# Patient Record
Sex: MALE | Race: BLACK OR AFRICAN AMERICAN | NOT HISPANIC OR LATINO | URBAN - METROPOLITAN AREA
[De-identification: names, ages, dates, MRNs, and addresses within clinical notes are randomized per-mention and may not be internally consistent; named-entity substitution may affect disease eponyms.]

---

## 2023-08-19 PROBLEM — N52.9 ERECTILE DYSFUNCTION: Status: ACTIVE | Noted: 2023-08-19

## 2023-08-19 PROBLEM — E78.9 ABNORMAL CHOLESTEROL TEST: Status: ACTIVE | Noted: 2023-08-19

## 2023-08-19 PROBLEM — E87.6 HYPOKALEMIA: Status: ACTIVE | Noted: 2023-08-19

## 2023-08-19 PROBLEM — M54.12 CERVICAL RADICULOPATHY: Status: ACTIVE | Noted: 2023-08-19

## 2023-08-19 PROBLEM — M54.16 LUMBAR RADICULOPATHY: Status: ACTIVE | Noted: 2023-08-19

## 2023-08-19 RX ORDER — CHOLECALCIFEROL (VITAMIN D3) 50 MCG
2000 TABLET ORAL EVERY OTHER DAY
COMMUNITY
Start: 2015-07-28

## 2023-08-19 RX ORDER — ROSUVASTATIN CALCIUM 10 MG/1
10 TABLET, COATED ORAL 2 TIMES WEEKLY
COMMUNITY
Start: 2019-08-22 | End: 2023-11-06

## 2023-08-19 RX ORDER — NABUMETONE 500 MG/1
1000 TABLET, FILM COATED ORAL 2 TIMES DAILY
COMMUNITY
Start: 2019-05-23

## 2023-08-20 NOTE — PROGRESS NOTES
Subjective   Reason for Visit: Harjit Kent Jr. is an 82 y.o. male here for a Medicare Wellness visit.               HPI  The patient reports a history of intermittent episodes of pain-unable to describe-over the right lower leg over the past 3 to 4 months.  He reports that the episodes have been precipitated by a great deal of walking.  He also reports a history of intermittent episodes of numbness in the toes of both feet over the past 3 to 4 months.  He reports that the episodes have been precipitated by sitting.  He reports no episodes of lower back pain this calendar year.  He reports no lower extremity weakness.  No other associated symptoms.  The patient reports that he has been trying to do as much in the way of physical therapy exercises as he can over the past several months which she feels has helped his condition probably more than the epidural that he received in October.    The patient reports no difficulty maintaining erections during intercourse over the past several months to a year.  He has not even used sildenafil.    He reports that he did sustain a burn on the ventral surface of the distal and of the right forearm recently.  He reports no fever, chills, drainage from the site.  No other new complaints.  Patient Care Team:  Josiah Beyer MD as PCP - General  Jos[facility] Beyer MD as PCP [facility]     Review of Systems  All systems have been reviewed and are normal except as previously noted  Objective   Vitals:  There were no vitals taken for this visit.      Physical Exam  Head - palpation revealed no tenderness over the maxillary or frontal sinuses.  Eyes - extraocular movements intact pupils equal and reactive to light fundi; revealed good retinal color no hemorrhages or exudates.  Ears - palpation of pinnas and traguses revealed no tenderness. External auditory canals not erythematous or swollen. TMs clear.   Nose - turbinates not erythematous or swollen; no septal deviation  noted.  Mouth - posterior pharynx is not erythematous or swollen. Tonsillar pillars appeared normal no exudates.  Neck - no lymphadenopathy. Thyroid gland not enlarged. No bruits.  Lungs - clear to auscultation bilaterally.  Cardiac - rate normal,, rhythm regular no murmurs no JVD.  Abdomen - soft nondistended normal active bowel sounds. Palpation revealed no tenderness or masses.  Rectal-scant stool guaiac negative. Hemorrhoids noted. Prostate gland only mildly enlarged, no masses  Pulses - 2+ bilaterally except left dorsalis pedis pulse-0  Extremities - no peripheral edema.  Skin - Blackish discoloration of all of the fingernails left hand. No erythema or eruptions noted.  Blackish discoloration of right and left first toenails and the left 2nd toenail also noted..  Skin-there is a 3 x 3 cm burn located on the ventral surface distal end of the right forearm.  No necrotic tissue noted.  No drainage noted.  No surrounding erythema noted.  Palpation revealed no tenderness or increase in warmth  Musculoskeletal  Lumbar spine-no erythema or swelling. Full range of motion with mild discomfort noted over the inferior surface of the right buttock on flexion, extension, and bending to the right. Full range of motion with no pain noted in all other directions of motion. Palpation revealed no tenderness or increase in warmth    Neurologic  Mental status-alert and oriented x3   Cranial nerves-2 through 12 grossly intact, no visual field abnormalities  Motor-no pronator drift noted, strength-5/5 in all muscle groups tested, , no tremor noted.  No bradykinesia noted.  No rigidity noted.  Negative pull test  Sensory-Light touch sensation fully intact  Pinprick sensation fully intact  Vibratory sensation not present right first toe, moderately diminished left first toe  Cerebellar-no truncal ataxia, good coordination finger-nose testing,, good coordination heel-to-shin testing, normal rapid alternating movements  Positive  Romberg; moderate abnormality in tandem gait.   Reflexes-ankle jerks 1+/4 bilaterally all other reflexes tested 2+/4 bilaterally  Assessment/Plan   Problem List Items Addressed This Visit    None       Assessment.  Elevated CT cardiac scoring test  Chronic urgency, chronic nocturia x2, chronic mild post void dribbling- probably secondary to BPH  Colonic polyps  Diverticulosis  Vitamin D deficiency-mild  Hypercholesterolemia  Bilateral cataracts  Presence of a burn located ventral surface distal end of the right forearm and appears to have healed  Onychomycosis of the first toenails and the third to the fifth toenails left foot first through third fingernails of the left hand  History of a brief episode of presyncope occurring July 2022--may have been secondary to orthostatic hypotension-etiology unclear.  Central canal stenosis and bilateral neuroforaminal stenosis cervical spine status post anterior cervical discectomy and fusion February 2010 secondary to herniated cervical discs.  Intermittent episodes of pain-unable to describe-right lower leg, intermittent episodes of numbness in the toes of both feet-probably secondary to central canal stenosis, bilateral neuroforaminal stenosis lumbar spine  Central canal stenosis at L4-L5, L3-L4, L2-L3.  Bilateral neuroforaminal stenosis L4-L5  Moderate narrowing of both subarticular recesses L5-S1        Plan.  Obtain EKG, urinalysis, CBC differential, CMP, fasting lipid profile, TSH, vitamin D level, vitamin B12 level, PSA today.  The patient is declining the pneumonia vaccine, and Shingrix and the flu vaccine for that matter...  Continue current medication regimen for now pending the results of lab work.  Patient should return for annual Medicare wellness visit in 1 year

## 2023-08-22 ENCOUNTER — OFFICE VISIT (OUTPATIENT)
Dept: PRIMARY CARE | Facility: CLINIC | Age: 83
End: 2023-08-22
Payer: MEDICARE

## 2023-08-22 ENCOUNTER — LAB (OUTPATIENT)
Dept: LAB | Facility: LAB | Age: 83
End: 2023-08-22
Payer: MEDICARE

## 2023-08-22 VITALS
WEIGHT: 155.25 LBS | DIASTOLIC BLOOD PRESSURE: 66 MMHG | HEART RATE: 82 BPM | SYSTOLIC BLOOD PRESSURE: 104 MMHG | BODY MASS INDEX: 22.93 KG/M2

## 2023-08-22 DIAGNOSIS — E78.9 ABNORMAL CHOLESTEROL TEST: ICD-10-CM

## 2023-08-22 DIAGNOSIS — F41.9 ANXIETY DISORDER, UNSPECIFIED TYPE: ICD-10-CM

## 2023-08-22 DIAGNOSIS — N40.1 BENIGN PROSTATIC HYPERPLASIA WITH URINARY FREQUENCY: ICD-10-CM

## 2023-08-22 DIAGNOSIS — R35.0 BENIGN PROSTATIC HYPERPLASIA WITH URINARY FREQUENCY: ICD-10-CM

## 2023-08-22 DIAGNOSIS — E78.9 ABNORMAL CHOLESTEROL TEST: Primary | ICD-10-CM

## 2023-08-22 DIAGNOSIS — E55.9 VITAMIN D DEFICIENCY: ICD-10-CM

## 2023-08-22 DIAGNOSIS — M54.16 LUMBAR RADICULOPATHY: ICD-10-CM

## 2023-08-22 DIAGNOSIS — E87.6 HYPOKALEMIA: ICD-10-CM

## 2023-08-22 DIAGNOSIS — N52.8 OTHER MALE ERECTILE DYSFUNCTION: ICD-10-CM

## 2023-08-22 LAB
ALANINE AMINOTRANSFERASE (SGPT) (U/L) IN SER/PLAS: 30 U/L (ref 10–52)
ALBUMIN (G/DL) IN SER/PLAS: 4.6 G/DL (ref 3.4–5)
ALKALINE PHOSPHATASE (U/L) IN SER/PLAS: 77 U/L (ref 33–136)
ANION GAP IN SER/PLAS: 14 MMOL/L (ref 10–20)
ASPARTATE AMINOTRANSFERASE (SGOT) (U/L) IN SER/PLAS: 29 U/L (ref 9–39)
BASOPHILS (10*3/UL) IN BLOOD BY AUTOMATED COUNT: 0.03 X10E9/L (ref 0–0.1)
BASOPHILS/100 LEUKOCYTES IN BLOOD BY AUTOMATED COUNT: 0.7 % (ref 0–2)
BILIRUBIN TOTAL (MG/DL) IN SER/PLAS: 1 MG/DL (ref 0–1.2)
C REACTIVE PROTEIN (MG/L) IN SER/PLAS BY HIGH SENSIT: 1.3 MG/L
CALCIDIOL (25 OH VITAMIN D3) (NG/ML) IN SER/PLAS: 45 NG/ML
CALCIUM (MG/DL) IN SER/PLAS: 10 MG/DL (ref 8.6–10.6)
CARBON DIOXIDE, TOTAL (MMOL/L) IN SER/PLAS: 29 MMOL/L (ref 21–32)
CHLORIDE (MMOL/L) IN SER/PLAS: 104 MMOL/L (ref 98–107)
CHOLESTEROL (MG/DL) IN SER/PLAS: 161 MG/DL (ref 0–199)
CHOLESTEROL IN HDL (MG/DL) IN SER/PLAS: 71.7 MG/DL
CHOLESTEROL/HDL RATIO: 2.2
COBALAMIN (VITAMIN B12) (PG/ML) IN SER/PLAS: 227 PG/ML (ref 211–911)
CREATININE (MG/DL) IN SER/PLAS: 1.12 MG/DL (ref 0.5–1.3)
EOSINOPHILS (10*3/UL) IN BLOOD BY AUTOMATED COUNT: 0.05 X10E9/L (ref 0–0.4)
EOSINOPHILS/100 LEUKOCYTES IN BLOOD BY AUTOMATED COUNT: 1.1 % (ref 0–6)
ERYTHROCYTE DISTRIBUTION WIDTH (RATIO) BY AUTOMATED COUNT: 13.8 % (ref 11.5–14.5)
ERYTHROCYTE MEAN CORPUSCULAR HEMOGLOBIN CONCENTRATION (G/DL) BY AUTOMATED: 32.9 G/DL (ref 32–36)
ERYTHROCYTE MEAN CORPUSCULAR VOLUME (FL) BY AUTOMATED COUNT: 96 FL (ref 80–100)
ERYTHROCYTES (10*6/UL) IN BLOOD BY AUTOMATED COUNT: 4.92 X10E12/L (ref 4.5–5.9)
GFR MALE: 66 ML/MIN/1.73M2
GLUCOSE (MG/DL) IN SER/PLAS: 88 MG/DL (ref 74–99)
HEMATOCRIT (%) IN BLOOD BY AUTOMATED COUNT: 47.4 % (ref 41–52)
HEMOGLOBIN (G/DL) IN BLOOD: 15.6 G/DL (ref 13.5–17.5)
IMMATURE GRANULOCYTES/100 LEUKOCYTES IN BLOOD BY AUTOMATED COUNT: 0 % (ref 0–0.9)
LDL: 73 MG/DL (ref 0–99)
LEUKOCYTES (10*3/UL) IN BLOOD BY AUTOMATED COUNT: 4.5 X10E9/L (ref 4.4–11.3)
LYMPHOCYTES (10*3/UL) IN BLOOD BY AUTOMATED COUNT: 1.2 X10E9/L (ref 0.8–3)
LYMPHOCYTES/100 LEUKOCYTES IN BLOOD BY AUTOMATED COUNT: 26.4 % (ref 13–44)
MONOCYTES (10*3/UL) IN BLOOD BY AUTOMATED COUNT: 0.41 X10E9/L (ref 0.05–0.8)
MONOCYTES/100 LEUKOCYTES IN BLOOD BY AUTOMATED COUNT: 9 % (ref 2–10)
NEUTROPHILS (10*3/UL) IN BLOOD BY AUTOMATED COUNT: 2.85 X10E9/L (ref 1.6–5.5)
NEUTROPHILS/100 LEUKOCYTES IN BLOOD BY AUTOMATED COUNT: 62.8 % (ref 40–80)
NRBC (PER 100 WBCS) BY AUTOMATED COUNT: 0 /100 WBC (ref 0–0)
PLATELETS (10*3/UL) IN BLOOD AUTOMATED COUNT: 202 X10E9/L (ref 150–450)
POC APPEARANCE, URINE: CLEAR
POC BILIRUBIN, URINE: NEGATIVE
POC BLOOD, URINE: ABNORMAL
POC COLOR, URINE: YELLOW
POC GLUCOSE, URINE: NEGATIVE MG/DL
POC KETONES, URINE: NEGATIVE MG/DL
POC LEUKOCYTES, URINE: NEGATIVE
POC NITRITE,URINE: NEGATIVE
POC PH, URINE: 5 PH
POC PROTEIN, URINE: NEGATIVE MG/DL
POC SPECIFIC GRAVITY, URINE: 1.01
POC UROBILINOGEN, URINE: 0.2 EU/DL
POTASSIUM (MMOL/L) IN SER/PLAS: 4 MMOL/L (ref 3.5–5.3)
PROSTATE SPECIFIC AG (NG/ML) IN SER/PLAS: 0.65 NG/ML (ref 0–4)
PROTEIN TOTAL: 7.1 G/DL (ref 6.4–8.2)
SODIUM (MMOL/L) IN SER/PLAS: 143 MMOL/L (ref 136–145)
THYROTROPIN (MIU/L) IN SER/PLAS BY DETECTION LIMIT <= 0.05 MIU/L: 1.39 MIU/L (ref 0.44–3.98)
TRIGLYCERIDE (MG/DL) IN SER/PLAS: 84 MG/DL (ref 0–149)
UREA NITROGEN (MG/DL) IN SER/PLAS: 15 MG/DL (ref 6–23)
VLDL: 17 MG/DL (ref 0–40)

## 2023-08-22 PROCEDURE — 1170F FXNL STATUS ASSESSED: CPT | Performed by: INTERNAL MEDICINE

## 2023-08-22 PROCEDURE — 84153 ASSAY OF PSA TOTAL: CPT

## 2023-08-22 PROCEDURE — 99215 OFFICE O/P EST HI 40 MIN: CPT | Performed by: INTERNAL MEDICINE

## 2023-08-22 PROCEDURE — 84443 ASSAY THYROID STIM HORMONE: CPT

## 2023-08-22 PROCEDURE — 1160F RVW MEDS BY RX/DR IN RCRD: CPT | Performed by: INTERNAL MEDICINE

## 2023-08-22 PROCEDURE — 85025 COMPLETE CBC W/AUTO DIFF WBC: CPT

## 2023-08-22 PROCEDURE — 80061 LIPID PANEL: CPT

## 2023-08-22 PROCEDURE — 82607 VITAMIN B-12: CPT

## 2023-08-22 PROCEDURE — 86141 C-REACTIVE PROTEIN HS: CPT

## 2023-08-22 PROCEDURE — 80053 COMPREHEN METABOLIC PANEL: CPT

## 2023-08-22 PROCEDURE — 81002 URINALYSIS NONAUTO W/O SCOPE: CPT | Performed by: INTERNAL MEDICINE

## 2023-08-22 PROCEDURE — 82306 VITAMIN D 25 HYDROXY: CPT

## 2023-08-22 PROCEDURE — 36415 COLL VENOUS BLD VENIPUNCTURE: CPT

## 2023-08-22 PROCEDURE — 1159F MED LIST DOCD IN RCRD: CPT | Performed by: INTERNAL MEDICINE

## 2023-08-22 RX ORDER — SILDENAFIL CITRATE 20 MG/1
60 TABLET ORAL
Qty: 60 TABLET | Refills: 1 | Status: SHIPPED | OUTPATIENT
Start: 2023-08-22

## 2023-08-22 RX ORDER — SILDENAFIL CITRATE 20 MG/1
20 TABLET ORAL
COMMUNITY
Start: 2016-08-01 | End: 2023-08-22 | Stop reason: SDUPTHER

## 2023-08-22 RX ORDER — METHYLPREDNISOLONE 4 MG/1
TABLET ORAL
Qty: 21 TABLET | Refills: 0 | Status: SHIPPED | OUTPATIENT
Start: 2023-08-22 | End: 2023-08-29

## 2023-08-22 ASSESSMENT — ACTIVITIES OF DAILY LIVING (ADL)
GROCERY_SHOPPING: INDEPENDENT
DOING_HOUSEWORK: INDEPENDENT
DRESSING: INDEPENDENT
TAKING_MEDICATION: INDEPENDENT
BATHING: INDEPENDENT
MANAGING_FINANCES: INDEPENDENT

## 2023-08-22 ASSESSMENT — ENCOUNTER SYMPTOMS
LOSS OF SENSATION IN FEET: 1
DEPRESSION: 0
OCCASIONAL FEELINGS OF UNSTEADINESS: 0

## 2023-11-04 DIAGNOSIS — E87.6 HYPOKALEMIA: ICD-10-CM

## 2023-11-04 DIAGNOSIS — E78.9 ABNORMAL CHOLESTEROL TEST: ICD-10-CM

## 2023-11-06 RX ORDER — ROSUVASTATIN CALCIUM 10 MG/1
TABLET, COATED ORAL
Qty: 26 TABLET | Refills: 3 | Status: SHIPPED | OUTPATIENT
Start: 2023-11-06

## 2024-08-25 NOTE — PROGRESS NOTES
Subjective   Reason for Visit: Harjit Kent Jr. is an 83 y.o. male here for a Medicare Wellness visit.               HPI  Over the past several months or longer, the patient reports no episodes of pain in the right lower leg and no episodes of numbness in the toes of both feet.  He reports no other new complaints.  Patient Care Team:  Josiah Beyer MD as PCP - General (Internal Medicine)  [facility]     Review of Systems  All systems have been reviewed and are normal except as previously noted  Objective   Vitals:  There were no vitals taken for this visit.      Physical Exam  Head - palpation revealed no tenderness over the maxillary or frontal sinuses.  Eyes - extraocular movements intact pupils equal and reactive to light fundi; revealed good retinal color no hemorrhages or exudates.  Ears - palpation of pinnas and traguses revealed no tenderness. External auditory canals not erythematous or swollen. TMs clear.   Nose - turbinates not erythematous or swollen; no septal deviation noted.  Mouth - posterior pharynx is not erythematous or swollen. Tonsillar pillars appeared normal no exudates.  Neck - no lymphadenopathy. Thyroid gland not enlarged. No bruits.  Lungs - clear to auscultation bilaterally.  Cardiac - rate normal,, rhythm regular no murmurs no JVD.  Abdomen - soft nondistended normal active bowel sounds. Palpation revealed no tenderness or masses.    Pulses - 2+ bilaterally except dorsalis pedis pulse left foot-0  Extremities - no peripheral edema.  Skin - Blackish discoloration of all of the fingernails left hand. No erythema or eruptions noted.  Blackish discoloration of right and left first toenails and the left 2nd toenail also noted..         Neurologic  Mental status-alert and oriented x3   Cranial nerves-2 through 12 grossly intact, no visual field abnormalities  Motor-no pronator drift noted, strength-5/5 in all muscle groups tested, , no tremor noted.   No bradykinesia noted.  No rigidity noted.  Negative pull test  Sensory-Light touch sensation fully intact  Pinprick sensation fully intact  Vibratory sensation diminished in the lower extremities bilaterally equally  Cerebellar-no truncal ataxia, good coordination finger-nose testing,, good coordination heel-to-shin testing, normal rapid alternating movements  Positive Romberg; poor coordination in tandem gait.   Reflexes-ankle jerks 1+/4 bilaterally all other reflexes tested 2+/4 bilaterally  Assessment/Plan   Elevated CT calcium score  Colonic polyps  Diverticulosis  Hypercholesterolemia  Lumbar radiculopathy        Assessment.  Elevated CT calcium score test  Chronic urgency, chronic nocturia x2, chronic mild post void dribbling- probably secondary to BPH  Colonic polyps  Diverticulosis  Vitamin D deficiency-mild  Hypercholesterolemia  Bilateral cataracts  Onychomycosis of the first toenails and the third to the fifth toenails left foot first through third fingernails of the left hand  History of a brief episode of presyncope occurring July 2022--may have been secondary to orthostatic hypotension-etiology unclear.  Central canal stenosis and bilateral neuroforaminal stenosis cervical spine status post anterior cervical discectomy and fusion February 2010 secondary to herniated cervical discs.  Central canal stenosis at L4-L5, L3-L4, L2-L3.  Bilateral neuroforaminal stenosis L4-L5  Moderate narrowing of both subarticular recesses L5-S1        Plan.  Obtain EKG, urinalysis, CBC differential, CMP, fasting lipid profile, TSH, vitamin D level, vitamin B12 level, PSA today..  Obtain serum immunoelectrophoresis as well today  The patient is declining the pneumonia vaccine, and Shingrix and the flu vaccine for that matter...  Continue current medication regimen for now pending the results of lab work.  Patient should return for annual Medicare wellness visit in 1 year

## 2024-08-27 ENCOUNTER — APPOINTMENT (OUTPATIENT)
Dept: PRIMARY CARE | Facility: CLINIC | Age: 84
End: 2024-08-27
Payer: MEDICARE

## 2024-08-27 ENCOUNTER — LAB (OUTPATIENT)
Dept: LAB | Facility: LAB | Age: 84
End: 2024-08-27
Payer: MEDICARE

## 2024-08-27 VITALS
WEIGHT: 154.4 LBS | HEART RATE: 92 BPM | BODY MASS INDEX: 22.8 KG/M2 | SYSTOLIC BLOOD PRESSURE: 100 MMHG | DIASTOLIC BLOOD PRESSURE: 60 MMHG

## 2024-08-27 DIAGNOSIS — E78.9 ABNORMAL CHOLESTEROL TEST: ICD-10-CM

## 2024-08-27 DIAGNOSIS — R25.2 CRAMP AND SPASM: ICD-10-CM

## 2024-08-27 DIAGNOSIS — M54.16 LUMBAR RADICULOPATHY: ICD-10-CM

## 2024-08-27 DIAGNOSIS — E55.9 VITAMIN D DEFICIENCY: ICD-10-CM

## 2024-08-27 DIAGNOSIS — N40.1 BENIGN PROSTATIC HYPERPLASIA WITH URINARY FREQUENCY: ICD-10-CM

## 2024-08-27 DIAGNOSIS — E78.9 ABNORMAL CHOLESTEROL TEST: Primary | ICD-10-CM

## 2024-08-27 DIAGNOSIS — R35.0 BENIGN PROSTATIC HYPERPLASIA WITH URINARY FREQUENCY: ICD-10-CM

## 2024-08-27 DIAGNOSIS — N52.8 OTHER MALE ERECTILE DYSFUNCTION: ICD-10-CM

## 2024-08-27 LAB
25(OH)D3 SERPL-MCNC: 56 NG/ML (ref 30–100)
ALBUMIN SERPL BCP-MCNC: 4.2 G/DL (ref 3.4–5)
ALP SERPL-CCNC: 67 U/L (ref 33–136)
ALT SERPL W P-5'-P-CCNC: 21 U/L (ref 10–52)
ANION GAP SERPL CALC-SCNC: 15 MMOL/L (ref 10–20)
AST SERPL W P-5'-P-CCNC: 26 U/L (ref 9–39)
BASOPHILS # BLD AUTO: 0.03 X10*3/UL (ref 0–0.1)
BASOPHILS NFR BLD AUTO: 0.9 %
BILIRUB SERPL-MCNC: 1.1 MG/DL (ref 0–1.2)
BUN SERPL-MCNC: 17 MG/DL (ref 6–23)
CALCIUM SERPL-MCNC: 9.9 MG/DL (ref 8.6–10.6)
CHLORIDE SERPL-SCNC: 105 MMOL/L (ref 98–107)
CHOLEST SERPL-MCNC: 168 MG/DL (ref 0–199)
CHOLESTEROL/HDL RATIO: 2.8
CO2 SERPL-SCNC: 28 MMOL/L (ref 21–32)
CREAT SERPL-MCNC: 1.17 MG/DL (ref 0.5–1.3)
CRP SERPL HS-MCNC: 0.8 MG/L
EGFRCR SERPLBLD CKD-EPI 2021: 63 ML/MIN/1.73M*2
EOSINOPHIL # BLD AUTO: 0.06 X10*3/UL (ref 0–0.4)
EOSINOPHIL NFR BLD AUTO: 1.7 %
ERYTHROCYTE [DISTWIDTH] IN BLOOD BY AUTOMATED COUNT: 14.2 % (ref 11.5–14.5)
GLUCOSE SERPL-MCNC: 83 MG/DL (ref 74–99)
HCT VFR BLD AUTO: 41.9 % (ref 41–52)
HDLC SERPL-MCNC: 60.3 MG/DL
HGB BLD-MCNC: 13.9 G/DL (ref 13.5–17.5)
IMM GRANULOCYTES # BLD AUTO: 0.01 X10*3/UL (ref 0–0.5)
IMM GRANULOCYTES NFR BLD AUTO: 0.3 % (ref 0–0.9)
LDLC SERPL CALC-MCNC: 93 MG/DL
LYMPHOCYTES # BLD AUTO: 1.08 X10*3/UL (ref 0.8–3)
LYMPHOCYTES NFR BLD AUTO: 31.1 %
MCH RBC QN AUTO: 30.7 PG (ref 26–34)
MCHC RBC AUTO-ENTMCNC: 33.2 G/DL (ref 32–36)
MCV RBC AUTO: 93 FL (ref 80–100)
MONOCYTES # BLD AUTO: 0.41 X10*3/UL (ref 0.05–0.8)
MONOCYTES NFR BLD AUTO: 11.8 %
NEUTROPHILS # BLD AUTO: 1.88 X10*3/UL (ref 1.6–5.5)
NEUTROPHILS NFR BLD AUTO: 54.2 %
NON HDL CHOLESTEROL: 108 MG/DL (ref 0–149)
NRBC BLD-RTO: 0.6 /100 WBCS (ref 0–0)
PLATELET # BLD AUTO: 192 X10*3/UL (ref 150–450)
POC APPEARANCE, URINE: CLEAR
POC BILIRUBIN, URINE: ABNORMAL
POC BLOOD, URINE: ABNORMAL
POC COLOR, URINE: YELLOW
POC GLUCOSE, URINE: NEGATIVE MG/DL
POC KETONES, URINE: NEGATIVE MG/DL
POC LEUKOCYTES, URINE: NEGATIVE
POC NITRITE,URINE: NEGATIVE
POC PH, URINE: 5.5 PH
POC PROTEIN, URINE: ABNORMAL MG/DL
POC SPECIFIC GRAVITY, URINE: 1.02
POC UROBILINOGEN, URINE: 0.2 EU/DL
POTASSIUM SERPL-SCNC: 3.9 MMOL/L (ref 3.5–5.3)
PROT SERPL-MCNC: 6.8 G/DL (ref 6.4–8.2)
PROT SERPL-MCNC: 6.8 G/DL (ref 6.4–8.2)
PSA SERPL-MCNC: 0.83 NG/ML
RBC # BLD AUTO: 4.53 X10*6/UL (ref 4.5–5.9)
SODIUM SERPL-SCNC: 144 MMOL/L (ref 136–145)
TRIGL SERPL-MCNC: 76 MG/DL (ref 0–149)
TSH SERPL-ACNC: 1.99 MIU/L (ref 0.44–3.98)
VIT B12 SERPL-MCNC: 244 PG/ML (ref 211–911)
VLDL: 15 MG/DL (ref 0–40)
WBC # BLD AUTO: 3.5 X10*3/UL (ref 4.4–11.3)

## 2024-08-27 PROCEDURE — 84165 PROTEIN E-PHORESIS SERUM: CPT

## 2024-08-27 PROCEDURE — 86141 C-REACTIVE PROTEIN HS: CPT

## 2024-08-27 PROCEDURE — 1160F RVW MEDS BY RX/DR IN RCRD: CPT | Performed by: INTERNAL MEDICINE

## 2024-08-27 PROCEDURE — 81003 URINALYSIS AUTO W/O SCOPE: CPT | Performed by: INTERNAL MEDICINE

## 2024-08-27 PROCEDURE — 84155 ASSAY OF PROTEIN SERUM: CPT

## 2024-08-27 PROCEDURE — 84443 ASSAY THYROID STIM HORMONE: CPT

## 2024-08-27 PROCEDURE — 82607 VITAMIN B-12: CPT

## 2024-08-27 PROCEDURE — 85025 COMPLETE CBC W/AUTO DIFF WBC: CPT

## 2024-08-27 PROCEDURE — 1170F FXNL STATUS ASSESSED: CPT | Performed by: INTERNAL MEDICINE

## 2024-08-27 PROCEDURE — 93000 ELECTROCARDIOGRAM COMPLETE: CPT | Performed by: INTERNAL MEDICINE

## 2024-08-27 PROCEDURE — 84153 ASSAY OF PSA TOTAL: CPT

## 2024-08-27 PROCEDURE — G0439 PPPS, SUBSEQ VISIT: HCPCS | Performed by: INTERNAL MEDICINE

## 2024-08-27 PROCEDURE — 80061 LIPID PANEL: CPT

## 2024-08-27 PROCEDURE — 36415 COLL VENOUS BLD VENIPUNCTURE: CPT

## 2024-08-27 PROCEDURE — 80053 COMPREHEN METABOLIC PANEL: CPT

## 2024-08-27 PROCEDURE — 1159F MED LIST DOCD IN RCRD: CPT | Performed by: INTERNAL MEDICINE

## 2024-08-27 PROCEDURE — 82306 VITAMIN D 25 HYDROXY: CPT

## 2024-08-27 RX ORDER — ROSUVASTATIN CALCIUM 10 MG/1
10 TABLET, COATED ORAL DAILY
Qty: 26 TABLET | Refills: 3 | Status: SHIPPED | OUTPATIENT
Start: 2024-08-27 | End: 2024-08-28 | Stop reason: SDUPTHER

## 2024-08-27 ASSESSMENT — ACTIVITIES OF DAILY LIVING (ADL)
TAKING_MEDICATION: INDEPENDENT
MANAGING_FINANCES: INDEPENDENT
DOING_HOUSEWORK: INDEPENDENT
GROCERY_SHOPPING: INDEPENDENT
BATHING: INDEPENDENT
DRESSING: INDEPENDENT

## 2024-08-27 ASSESSMENT — ENCOUNTER SYMPTOMS
OCCASIONAL FEELINGS OF UNSTEADINESS: 1
LOSS OF SENSATION IN FEET: 0
DEPRESSION: 0

## 2024-08-28 DIAGNOSIS — E78.9 ABNORMAL CHOLESTEROL TEST: ICD-10-CM

## 2024-08-28 RX ORDER — ROSUVASTATIN CALCIUM 10 MG/1
10 TABLET, COATED ORAL DAILY
Qty: 90 TABLET | Refills: 3 | Status: SHIPPED | OUTPATIENT
Start: 2024-08-28

## 2024-08-29 LAB
ALBUMIN: 4.1 G/DL (ref 3.4–5)
ALPHA 1 GLOBULIN: 0.3 G/DL (ref 0.2–0.6)
ALPHA 2 GLOBULIN: 0.6 G/DL (ref 0.4–1.1)
BETA GLOBULIN: 0.7 G/DL (ref 0.5–1.2)
GAMMA GLOBULIN: 1 G/DL (ref 0.5–1.4)
PATH REVIEW-SERUM PROTEIN ELECTROPHORESIS: NORMAL
PROTEIN ELECTROPHORESIS COMMENT: NORMAL

## 2024-09-12 ENCOUNTER — OFFICE VISIT (OUTPATIENT)
Dept: URGENT CARE | Age: 84
End: 2024-09-12
Payer: MEDICARE

## 2024-09-12 VITALS
BODY MASS INDEX: 23.04 KG/M2 | WEIGHT: 156 LBS | SYSTOLIC BLOOD PRESSURE: 157 MMHG | OXYGEN SATURATION: 98 % | TEMPERATURE: 98 F | DIASTOLIC BLOOD PRESSURE: 82 MMHG | HEART RATE: 74 BPM | RESPIRATION RATE: 16 BRPM

## 2024-09-12 DIAGNOSIS — B02.9 HERPES ZOSTER WITHOUT COMPLICATION: Primary | ICD-10-CM

## 2024-09-12 RX ORDER — VALACYCLOVIR HYDROCHLORIDE 1 G/1
1000 TABLET, FILM COATED ORAL 3 TIMES DAILY
Qty: 21 TABLET | Refills: 0 | Status: SHIPPED | OUTPATIENT
Start: 2024-09-12 | End: 2024-09-19

## 2024-09-12 NOTE — PROGRESS NOTES
HPI:  Patient States that on Monday night he had pain on the right side of his neck and took a hot shower 3 days ago in the morning and started to notice redness on the skin and blisters shortly after the shower and blisters have been spreading to his neck and chest and up the head on the right side. No fever/chills.  Pt states that blisters are itching and burning.  Pt denies CP or SOB.  No n/v/diarrhea.           ROS:  No fever/chills  No CP or SOB  +rash  No HA    PE:    A&O x3  NCAT  PERRLA, EOMI  TM clear bl   No pharyngeal erythema  RRR  CTAB  MOEx4  No focal deficit  Clusters of Vesicles on erythematous bases with partial crusting and open areas from scratching in dermatomal distribution on the right side of upper chest/right side of neck/right upper back/right side of scalp   Judgement normal      A/P:  Shingles    Your rash is secondary to shingles and not a skin burn.  Do not scratch skin.  Tylenol as needed for pain.  Start Valtrex.  Keep a diary of symptoms.  Recheck with your doctor in a week of no improvement.  Go to the Er if starts getting worse.

## 2024-09-14 ENCOUNTER — HOSPITAL ENCOUNTER (EMERGENCY)
Facility: HOSPITAL | Age: 84
Discharge: HOME | End: 2024-09-14
Attending: EMERGENCY MEDICINE
Payer: MEDICARE

## 2024-09-14 VITALS
BODY MASS INDEX: 23.11 KG/M2 | TEMPERATURE: 98.9 F | OXYGEN SATURATION: 97 % | HEART RATE: 102 BPM | WEIGHT: 156 LBS | RESPIRATION RATE: 18 BRPM | HEIGHT: 69 IN

## 2024-09-14 DIAGNOSIS — B02.9 HERPES ZOSTER WITHOUT COMPLICATION: Primary | ICD-10-CM

## 2024-09-14 PROCEDURE — 99283 EMERGENCY DEPT VISIT LOW MDM: CPT

## 2024-09-14 RX ORDER — TRAMADOL HYDROCHLORIDE 50 MG/1
50 TABLET ORAL EVERY 6 HOURS PRN
Qty: 15 TABLET | Refills: 0 | Status: SHIPPED | OUTPATIENT
Start: 2024-09-14 | End: 2024-09-16 | Stop reason: ALTCHOICE

## 2024-09-14 RX ORDER — PREDNISONE 20 MG/1
60 TABLET ORAL DAILY
Qty: 15 TABLET | Refills: 0 | Status: SHIPPED | OUTPATIENT
Start: 2024-09-14 | End: 2024-09-16 | Stop reason: ALTCHOICE

## 2024-09-14 ASSESSMENT — COLUMBIA-SUICIDE SEVERITY RATING SCALE - C-SSRS
2. HAVE YOU ACTUALLY HAD ANY THOUGHTS OF KILLING YOURSELF?: NO
1. IN THE PAST MONTH, HAVE YOU WISHED YOU WERE DEAD OR WISHED YOU COULD GO TO SLEEP AND NOT WAKE UP?: NO
6. HAVE YOU EVER DONE ANYTHING, STARTED TO DO ANYTHING, OR PREPARED TO DO ANYTHING TO END YOUR LIFE?: NO

## 2024-09-14 ASSESSMENT — PAIN SCALES - GENERAL: PAINLEVEL_OUTOF10: 0 - NO PAIN

## 2024-09-14 ASSESSMENT — PAIN - FUNCTIONAL ASSESSMENT: PAIN_FUNCTIONAL_ASSESSMENT: 0-10

## 2024-09-14 ASSESSMENT — PAIN DESCRIPTION - LOCATION: LOCATION: NECK

## 2024-09-14 ASSESSMENT — PAIN DESCRIPTION - DESCRIPTORS: DESCRIPTORS: SORE

## 2024-09-14 NOTE — ED TRIAGE NOTES
PT A&Ox4 from home complaining of soreness in neck due to shingles on right side neck and shoulder. PT states having chicken pox as a child. PT given medications for shingles at urgent care on Thursday.

## 2024-09-14 NOTE — ED PROVIDER NOTES
HPI   Chief Complaint   Patient presents with    Herpes Zoster       83-year-old male with a papulovesicular rash on the right side of his neck extending onto the shoulder and chest area.  Does not cross the midline.  Rash began 5 days ago.  Seen in urgent care Thursday, September 12.  Started on Valtrex 1 g 3 times daily.  Has had 6 doses.  No improvement.  He denies pain.  No fever.  States he feels fine other than the rash.  Patient is treated for high cholesterol with a statin.  No other routine medications.      History provided by:  Patient   used: No            Patient History   History reviewed. No pertinent past medical history.  History reviewed. No pertinent surgical history.  Family History   Problem Relation Name Age of Onset    Heart failure Mother      Stroke Mother      Hypertension Mother      Stroke Daughter      Other (Type 2 diabetes mellitus) Daughter       Social History     Tobacco Use    Smoking status: Never    Smokeless tobacco: Never   Substance Use Topics    Alcohol use: Never    Drug use: Not on file       Physical Exam   ED Triage Vitals [09/14/24 1649]   Temperature Heart Rate Respirations BP   37.2 °C (98.9 °F) (!) 102 18 --      Pulse Ox Temp src Heart Rate Source Patient Position   97 % -- -- Sitting      BP Location FiO2 (%)     Left arm --       Physical Exam  Vitals and nursing note reviewed.   Constitutional:       General: He is not in acute distress.     Appearance: Normal appearance. He is normal weight. He is not ill-appearing, toxic-appearing or diaphoretic.   HENT:      Head: Normocephalic and atraumatic.   Eyes:      Conjunctiva/sclera: Conjunctivae normal.      Pupils: Pupils are equal, round, and reactive to light.   Cardiovascular:      Rate and Rhythm: Normal rate.   Pulmonary:      Effort: Pulmonary effort is normal.   Musculoskeletal:         General: Normal range of motion.      Cervical back: Normal range of motion and neck supple.   Skin:      General: Skin is warm and dry.      Findings: Rash present.      Comments: Papular vesicular rash right neck scalp shoulder anterior chest.  No purulent drainage.  Patient reports not painful.  No purulent drainage.  Rash does not cross the midline.   Neurological:      General: No focal deficit present.      Mental Status: He is alert and oriented to person, place, and time.      Cranial Nerves: No cranial nerve deficit.      Sensory: No sensory deficit.      Motor: No weakness.   Psychiatric:         Mood and Affect: Mood normal.         Behavior: Behavior normal.         Thought Content: Thought content normal.         Judgment: Judgment normal.           ED Course & MDM   Diagnoses as of 09/14/24 2015   Herpes zoster without complication                 No data recorded     Ariana Coma Scale Score: 15 (09/14/24 1653 : Yazan Mg, EMT)                           Medical Decision Making  Evaluation consistent with herpes zoster.  Patient is already on Valtrex.  No apparent secondary infection.  No systemic symptoms.  Patient concerned that the rash has not improved after 6 doses of Valtrex.    The patient has also been seen and evaluated by the ER physician.  He arranged for discharge and continued outpatient management follow-up.        Procedure  Procedures     Angel Gomez PA-C  09/14/24 [id number]

## 2024-09-16 ENCOUNTER — OFFICE VISIT (OUTPATIENT)
Dept: PRIMARY CARE | Facility: CLINIC | Age: 84
End: 2024-09-16
Payer: MEDICARE

## 2024-09-16 VITALS
DIASTOLIC BLOOD PRESSURE: 84 MMHG | HEART RATE: 86 BPM | WEIGHT: 157 LBS | SYSTOLIC BLOOD PRESSURE: 120 MMHG | BODY MASS INDEX: 23.18 KG/M2

## 2024-09-16 DIAGNOSIS — B02.8 HERPES ZOSTER WITH OTHER COMPLICATION: Primary | ICD-10-CM

## 2024-09-16 PROCEDURE — 1159F MED LIST DOCD IN RCRD: CPT | Performed by: INTERNAL MEDICINE

## 2024-09-16 PROCEDURE — 1160F RVW MEDS BY RX/DR IN RCRD: CPT | Performed by: INTERNAL MEDICINE

## 2024-09-16 PROCEDURE — 99212 OFFICE O/P EST SF 10 MIN: CPT | Performed by: INTERNAL MEDICINE

## 2024-09-16 NOTE — PROGRESS NOTES
Subjective   Patient ID: Harjit Kent Jr. is a 83 y.o. male who presents for shingles    HPI   Over the past 6 days, the patient reports intermittent episodes of soreness in the region of the herpes zoster rash.  He reports that the episodes of soreness are precipitated by pressure being applied to the rash.  He reports no associated symptoms.  Over the past few days, he reports a decrease in the frequency and intensity of the episodes of soreness.  Since his visit to the emergency department 2 days ago, he reports no significant change in the appearance of the rash.  Review of Systems    Objective   There were no vitals taken for this visit.    Physical Exam  Skin-eroded vesicular eruption located in the right occipital region, right side of the posterior surface of the neck, right upper chest region medial surface of right upper arm and right upper back regions.  Few vesicles are located right upper back region.  No necrotic tissue noted.  No drainage noted..  Palpation revealed no tenderness or increase in warmth  Assessment/Plan        Assessment  Presence of an eroded vesicular eruption located in the right occipital region, right side of the posterior surface of the neck, right upper chest region, medial surface of the proximal end of the right upper arm and right upper back regions-likely represents acute herpes zoster.  Plan  I told the patient to complete his 1 week course of valacyclovir.  I told him that he could take nabumetone 1000 mg p.o. twice daily as needed.  At this time, I told the patient that he did not need to begin use of prednisone or tramadol.  I also recommended that the patient begin application of Domeboro soaks to the rash 2-3 times per day as needed.  He will call me in 1 week with his condition

## 2024-09-30 ENCOUNTER — TELEPHONE (OUTPATIENT)
Dept: PRIMARY CARE | Facility: CLINIC | Age: 84
End: 2024-09-30
Payer: MEDICARE

## 2024-09-30 DIAGNOSIS — B02.8 HERPES ZOSTER WITH OTHER COMPLICATION: Primary | ICD-10-CM

## 2024-09-30 NOTE — PROGRESS NOTES
Over the past few days, patient reports an extremely sensitive area behind the right ear where there is still a scab remnant of acute herpes zoster.  He has not noted any change in the frequency or intensity of the discomfort with use of nabumetone.  I prescribed lidocaine 4% gel to apply to the region 3 times per day as needed.  He will call me in 1 week with his condition

## 2024-09-30 NOTE — TELEPHONE ENCOUNTER
Patient has shingles, its clearing up, but he has pain. Would like to know if you could prescribe him something for the pain?

## 2024-10-14 ENCOUNTER — TELEPHONE (OUTPATIENT)
Dept: PRIMARY CARE | Facility: CLINIC | Age: 84
End: 2024-10-14
Payer: MEDICARE

## 2024-10-14 DIAGNOSIS — B02.8 HERPES ZOSTER WITH OTHER COMPLICATION: ICD-10-CM

## 2024-10-14 NOTE — TELEPHONE ENCOUNTER
Having shocking pain in neck also having numbness and tightening in both feet that gets worse when standing. Would like a call back.     [phone]

## 2024-10-15 ENCOUNTER — OFFICE VISIT (OUTPATIENT)
Dept: PRIMARY CARE | Facility: CLINIC | Age: 84
End: 2024-10-15
Payer: MEDICARE

## 2024-10-15 VITALS
HEART RATE: 82 BPM | SYSTOLIC BLOOD PRESSURE: 100 MMHG | DIASTOLIC BLOOD PRESSURE: 60 MMHG | WEIGHT: 151 LBS | BODY MASS INDEX: 22.3 KG/M2

## 2024-10-15 DIAGNOSIS — B02.8 HERPES ZOSTER WITH OTHER COMPLICATION: Primary | ICD-10-CM

## 2024-10-15 DIAGNOSIS — M54.12 CERVICAL RADICULOPATHY: ICD-10-CM

## 2024-10-15 PROCEDURE — 99213 OFFICE O/P EST LOW 20 MIN: CPT | Performed by: INTERNAL MEDICINE

## 2024-10-15 RX ORDER — NABUMETONE 500 MG/1
1000 TABLET, FILM COATED ORAL 2 TIMES DAILY
Qty: 120 TABLET | Refills: 2 | Status: SHIPPED | OUTPATIENT
Start: 2024-10-15

## 2024-10-15 RX ORDER — NORTRIPTYLINE HYDROCHLORIDE 10 MG/1
10 CAPSULE ORAL NIGHTLY
Qty: 30 CAPSULE | Refills: 5 | Status: SHIPPED | OUTPATIENT
Start: 2024-10-15 | End: 2025-04-13

## 2024-10-15 RX ORDER — METHYLPREDNISOLONE 4 MG/1
TABLET ORAL
Qty: 21 TABLET | Refills: 0 | Status: SHIPPED | OUTPATIENT
Start: 2024-10-15 | End: 2024-10-22

## 2024-10-15 NOTE — PROGRESS NOTES
Subjective   Patient ID: Harjit Kent Jr. is a 83 y.o. male who presents for right-sided neck pain and bilateral foot numbness    HPI   The patient reports experiencing frequent episodes of electrical shock pain along the right side of the neck x 1 month.  He does report that up until today he noted an increase in the intensity of the discomfort when turning his head when driving.  He reports that the pain is present in the area where his shingles was.  He also reports a history of intermittent episodes of numbness in both feet x 2 to 3 days and intermittent episodes of burning pain down the left arm x 2 to 3 days.  He reports that the episodes of numbness and burning pain seem to occur when the intensity and frequency of the electrical shock pains increases.  He reports no focal weakness, preceding trauma/overexertion.  He did apply lidocaine gel to the right side of the neck today at 11 AM and has noted a decrease in the frequency of the episodes of electrical shock pain  Review of Systems    Objective   There were no vitals taken for this visit.    Physical Exam  Musculoskeletal  Cervical spine-no erythema or swelling.  Full range of motion with no pain noted on flexion.  Decreased range of motion with no pain noted on rotation 15 degrees bilaterally, extension 20 degrees, bending to the right 10 degrees, bending to the left 15 degrees.  Palpation revealed no tenderness or increase in warmth    Skin-postinflammatory hyperpigmentation located along the right side of the neck extending to the right shoulder.  No necrotic tissue noted.  No drainage noted.  No surrounding erythema noted.  Palpation revealed no tenderness or increase in warmth  Neurologic  Upper and lower extremities:  Motor-strength 5/5 in all muscle groups tested  Sensory  Light touch sensation diminished left thumb  Pinprick sensation fully intact  Reflexes-upper extremities 2+/4 bilaterally, lower extremities 1+/4 bilaterally  Assessment/Plan    Problem List Items Addressed This Visit             ICD-10-CM    Cervical radiculopathy M54.12    Relevant Medications    nabumetone (Relafen) 500 mg tablet    methylPREDNISolone (Medrol Dospak) 4 mg tablets    nortriptyline (Pamelor) 10 mg capsule     Other Visit Diagnoses         Codes    Herpes zoster with other complication    -  Primary B02.8    Relevant Medications    nabumetone (Relafen) 500 mg tablet    methylPREDNISolone (Medrol Dospak) 4 mg tablets    nortriptyline (Pamelor) 10 mg capsule             Assessment  Frequent episodes of electrical shock pain located along the right side of the neck-May be secondary to postherpetic neuralgia  I suppose bilateral cervical radiculopathies secondary to central canal stenosis cervical spine, bilateral neuroforaminal stenosis cervical spine are possibility as well.  Intermittent episodes of numbness in both feet-May be secondary to postherpetic neuralgia, bilateral cervical radiculopathies secondary to central canal stenosis cervical spine, bilateral neuroforaminal stenosis cervical spine  Intermittent episodes of burning pain in the left arm-May be secondary to left cervical radiculopathy secondary to left-sided neuroforaminal stenosis cervical spine, central canal stenosis cervical spine.  Plan  Begin Medrol Dosepak as directed.  Begin nortriptyline 10 mg p.o. nightly  I told the patient that he could continue to apply lidocaine gel to the right side of the neck twice daily as needed.  He will return for a follow-up visit in 1 week

## 2024-10-20 NOTE — PROGRESS NOTES
Subjective   Patient ID: Harjit Kent Jr. is a 83 y.o. male who presents for follow-up visit    HPI   Since beginning a Medrol Dosepak after his last office visit, he reports experiencing only occasional episodes of electrical shock pain along the right side of the neck.  He reports no associated symptoms.  Over the past week, he reports a decrease in the frequency and intensity of the episodes.    He reports that since starting the Medrol Dosepak he only experiences episodes of numbness in both feet when he is sitting on a hard surface.  Also, since starting his Medrol Dosepak he has noted infrequent episodes of burning pain in the left arm.  He reports no precipitating, exacerbating, relieving factors.  He reports no associated left upper extremity weakness/paresthesias.  No other associated symptoms.  Over the past week, he reports a significant decrease in the frequency and intensity of the episodes.    He completed his Medrol Dosepak over the weekend.  Review of Systems    Objective   There were no vitals taken for this visit.    Physical Exam  Musculoskeletal  Cervical spine-no erythema or swelling.  Full range of motion with no pain noted on flexion, and rotation bilaterally.  Decreased range of motion with no pain noted on extension 20 degrees, bending to the right 20 degrees, bending to the left 20 degrees.  Palpation revealed no tenderness or increase in warmth     Skin-postinflammatory hyperpigmentation located along the right side of the neck extending to the right shoulder.  No necrotic tissue noted.  No drainage noted.  No surrounding erythema noted.  Palpation revealed no tenderness or increase in warmth  Neurologic  Upper and lower extremities:  Motor-strength 5/5 in all muscle groups tested  Sensory  Light touch sensation fully intact  Pinprick sensation fully intact  Reflexes-upper extremities 2+/4 bilaterally, lower extremities 1+/4 bilaterally  Assessment/Plan   Problem List Items Addressed This  Visit             ICD-10-CM    Cervical radiculopathy M54.12     Other Visit Diagnoses         Codes    Herpes zoster with other complication    -  Primary B02.8              Assessment  Continued recently infrequent  episodes of electrical shock pain located along the right side of the neck-May be secondary to postherpetic neuralgia  I suppose bilateral cervical radiculopathies secondary to central canal stenosis cervical spine, bilateral neuroforaminal stenosis cervical spine are possibility as well.  Continued intermittent episodes of numbness in both feet-May be secondary to postherpetic neuralgia, bilateral cervical radiculopathies secondary to central canal stenosis cervical spine, bilateral neuroforaminal stenosis cervical spine, bilateral lumbar radiculopathies secondary to central canal stenosis lumbar spine, bilateral neuroforaminal stenosis lumbar spine  Continued recently infrequent episodes of burning pain in the left arm-May be secondary to left cervical radiculopathy secondary to left-sided neuroforaminal stenosis cervical spine, central canal stenosis cervical spine.  Plan  Continue nortriptyline 10 mg p.o. nightly.  I told the patient that he could continue to apply lidocaine gel to the right side of the neck as needed and that he could use nabumetone at a dose of 1000 mg p.o. twice daily as needed.  He will return for a follow-up visit in 1 month

## 2024-10-22 ENCOUNTER — APPOINTMENT (OUTPATIENT)
Dept: PRIMARY CARE | Facility: CLINIC | Age: 84
End: 2024-10-22
Payer: MEDICARE

## 2024-10-22 VITALS
SYSTOLIC BLOOD PRESSURE: 108 MMHG | BODY MASS INDEX: 22.98 KG/M2 | DIASTOLIC BLOOD PRESSURE: 58 MMHG | HEART RATE: 86 BPM | WEIGHT: 155.6 LBS

## 2024-10-22 DIAGNOSIS — B02.8 HERPES ZOSTER WITH OTHER COMPLICATION: Primary | ICD-10-CM

## 2024-10-22 DIAGNOSIS — M54.12 CERVICAL RADICULOPATHY: ICD-10-CM

## 2024-10-22 PROCEDURE — 99212 OFFICE O/P EST SF 10 MIN: CPT | Performed by: INTERNAL MEDICINE

## 2024-11-17 NOTE — PROGRESS NOTES
Subjective   Patient ID: Harjit Kent Jr. is a 84 y.o. male who presents for follow-up visit    HPI   Over the past month, the patient reports infrequent episodes of an electrical shock pain located along the right side of the neck.  He reports no associated symptoms.  Over the past month, he reports a significant decrease in the frequency and intensity of the episodes    He reports continued infrequent episodes of burning pain in the left arm.  He reports again no precipitating, exacerbating, relieving factors.  He reports no associated symptoms.  Over the past month, he reports a decrease in the frequency and intensity of the episodes.    Over the past month, he reports experiencing rare episodes of numbness in both feet.  He reports that he can hit sit on a hard or chair now without experiencing the episodes.  He reports no associated symptoms.  Over the past month, he reports a decrease in the frequency and intensity of the episodes.      Review of Systems    Objective   There were no vitals taken for this visit.    Physical Exam  Musculoskeletal  Cervical spine-no erythema or swelling.  Full range of motion with no pain noted on flexion, and rotation bilaterally.  Decreased range of motion with no pain noted on extension 20 degrees, bending to the right 20 degrees, bending to the left 20 degrees.  Palpation revealed no tenderness or increase in warmth     Skin-postinflammatory hyperpigmentation located along the right side of the neck extending to the right shoulder.  No necrotic tissue noted.  No drainage noted.  No surrounding erythema noted.  Palpation revealed no tenderness or increase in warmth  Neurologic  Upper and lower extremities:  Motor-strength 5/5 in all muscle groups tested  Sensory  Light touch sensation fully intact  Pinprick sensation fully intact  Reflexes-upper extremities 2+/4 bilaterally, lower extremities 1+/4 bilaterally  Assessment/Plan   Problem List Items Addressed This Visit              ICD-10-CM    Cervical radiculopathy M54.12     Other Visit Diagnoses         Codes    Herpes zoster with other complication    -  Primary B02.8               Assessment  Continued recently infrequent  episodes of electrical shock pain located along the right side of the neck-May be secondary to postherpetic neuralgia  Continued recently rare episodes of numbness in both feet-May be secondary to postherpetic neuralgia, bilateral cervical radiculopathies secondary to central canal stenosis cervical spine, bilateral neuroforaminal stenosis cervical spine, bilateral lumbar radiculopathies secondary to central canal stenosis lumbar spine, bilateral neuroforaminal stenosis lumbar spine  Continued recently infrequent episodes of burning pain in the left arm-May be secondary to left cervical radiculopathy secondary to left-sided neuroforaminal stenosis cervical spine, central canal stenosis cervical spine.  Plan  Continue current medication regimen for now  Patient should return for a routine follow-up visit in 3 months.

## 2024-11-19 ENCOUNTER — APPOINTMENT (OUTPATIENT)
Dept: PRIMARY CARE | Facility: CLINIC | Age: 84
End: 2024-11-19
Payer: MEDICARE

## 2024-11-19 VITALS
SYSTOLIC BLOOD PRESSURE: 108 MMHG | WEIGHT: 153.4 LBS | HEART RATE: 86 BPM | DIASTOLIC BLOOD PRESSURE: 68 MMHG | BODY MASS INDEX: 22.65 KG/M2

## 2024-11-19 DIAGNOSIS — B02.8 HERPES ZOSTER WITH OTHER COMPLICATION: Primary | ICD-10-CM

## 2024-11-19 DIAGNOSIS — M54.12 CERVICAL RADICULOPATHY: ICD-10-CM

## 2024-11-19 PROCEDURE — 1160F RVW MEDS BY RX/DR IN RCRD: CPT | Performed by: INTERNAL MEDICINE

## 2024-11-19 PROCEDURE — 1159F MED LIST DOCD IN RCRD: CPT | Performed by: INTERNAL MEDICINE

## 2024-11-19 PROCEDURE — 99212 OFFICE O/P EST SF 10 MIN: CPT | Performed by: INTERNAL MEDICINE

## 2025-02-22 NOTE — PROGRESS NOTES
Subjective   Patient ID: Harjit Kent Jr. is a 84 y.o. male who presents for 6-month follow-up visit    HPI   The patient reports a history of intermittent episodes of pain-unable to describe-over the medial surface of the right knee times approximately 8 days.  He reports that the episodes of pain have been precipitated by the patient placing weight on the knee.  He reports associated constant swelling.  He reports no other associated symptoms.  He does report that since beginning use of nabumetone a few days ago, he has noted a decrease in the frequency and intensity of the episodes of pain and decreased amount of swelling.  He reports that the aforementioned symptoms developed after his knee gave way when he was snowblowing 1 week ago.    Over the past 3 months, the patient reports continued chronic infrequent episodes of an electrical shock pain located along the right side of the neck.  He reports that the episodes now mostly occur if a dry object is placed against the right side of the neck.  He reports no associated symptoms.  Over the past 3 months, he reports a decrease in the frequency and intensity of the episodes.    Since his last office visit, he reports no episodes of numbness in both feet and no episodes of burning pain in the left arm.  He reports no other new complaints.  Review of Systems    Objective   There were no vitals taken for this visit.    Physical Exam  Lungs - clear to auscultation bilaterally.  Cardiac - rate normal,, rhythm regular no murmurs no JVD.  Abdomen - soft nondistended normal active bowel sounds. Palpation revealed no tenderness or masses.  Pulses - 2+ bilaterally except dorsalis pedis pulse left foot-0  Extremities - no peripheral edema.  Musculoskeletal  Right knee-no erythema or swelling.  Full range of motion with mild soreness noted on extension.  Full range of motion with no soreness noted on flexion.  Palpation revealed no tenderness, increase in warmth, or crepitus.   Negative Lachemann's test, negative Yanelis's test, negative patellar apprehension test, no pain or laxity of the collateral ligaments noted.  Assessment/Plan   Problem List Items Addressed This Visit             ICD-10-CM    Abnormal cholesterol test E78.9    Cervical radiculopathy M54.12     Other Visit Diagnoses         Codes    Benign prostatic hyperplasia with urinary frequency    -  Primary N40.1, R35.0    Herpes zoster with other complication     B02.8    Acute pain of right knee     M25.561    Relevant Orders    XR knee right 1-2 views               Assessment.  Elevated CT calcium score test  Chronic urgency, chronic nocturia x2, chronic mild post void dribbling- probably secondary to BPH  Colonic polyps  Diverticulosis  Vitamin D deficiency-mild  Hypercholesterolemia  Bilateral cataracts  Acute herpes zoster September 12, 2024  Intermittent episodes of pain-unable to describe-over the medial surface of the right knee with associated constant swelling-May be secondary to medial meniscus tear, osteoarthritis.  Onychomycosis of the first toenails and the third to the fifth toenails left foot first through third fingernails of the left hand  History of a brief episode of presyncope occurring July 2022--may have been secondary to orthostatic hypotension-etiology unclear.  Chronic infrequent episodes of electrical shock pain located along the right side of the neck-May be secondary to postherpetic neuralgia  Central canal stenosis and bilateral neuroforaminal stenosis cervical spine status post anterior cervical discectomy and fusion February 2010 secondary to herniated cervical discs.  Central canal stenosis at L4-L5, L3-L4, L2-L3.  Bilateral neuroforaminal stenosis L4-L5  Moderate narrowing of both subarticular recesses L5-S1    Plan  Obtain x-rays of the right knee today.  Continue nabumetone 1000 mg p.o. twice daily as needed and nortriptyline 10 mg p.o. nightly.  Continue all other current medications as  well.  The patient will probably be a good candidate for an orthopedic referral.  He will keep his scheduled annual Medicare wellness visit September 8.

## 2025-02-24 ENCOUNTER — HOSPITAL ENCOUNTER (OUTPATIENT)
Dept: RADIOLOGY | Facility: CLINIC | Age: 85
Discharge: HOME | End: 2025-02-24
Payer: MEDICARE

## 2025-02-24 ENCOUNTER — APPOINTMENT (OUTPATIENT)
Dept: PRIMARY CARE | Facility: CLINIC | Age: 85
End: 2025-02-24
Payer: MEDICARE

## 2025-02-24 VITALS
BODY MASS INDEX: 22.89 KG/M2 | HEART RATE: 90 BPM | TEMPERATURE: 98.1 F | WEIGHT: 155 LBS | DIASTOLIC BLOOD PRESSURE: 60 MMHG | SYSTOLIC BLOOD PRESSURE: 100 MMHG

## 2025-02-24 DIAGNOSIS — M25.561 ACUTE PAIN OF RIGHT KNEE: ICD-10-CM

## 2025-02-24 DIAGNOSIS — R35.0 BENIGN PROSTATIC HYPERPLASIA WITH URINARY FREQUENCY: Primary | ICD-10-CM

## 2025-02-24 DIAGNOSIS — B02.8 HERPES ZOSTER WITH OTHER COMPLICATION: ICD-10-CM

## 2025-02-24 DIAGNOSIS — M54.12 CERVICAL RADICULOPATHY: ICD-10-CM

## 2025-02-24 DIAGNOSIS — N40.1 BENIGN PROSTATIC HYPERPLASIA WITH URINARY FREQUENCY: Primary | ICD-10-CM

## 2025-02-24 DIAGNOSIS — E78.9 ABNORMAL CHOLESTEROL TEST: ICD-10-CM

## 2025-02-24 PROCEDURE — 73562 X-RAY EXAM OF KNEE 3: CPT | Mod: RT

## 2025-02-24 PROCEDURE — 1159F MED LIST DOCD IN RCRD: CPT | Performed by: INTERNAL MEDICINE

## 2025-02-24 PROCEDURE — 99212 OFFICE O/P EST SF 10 MIN: CPT | Performed by: INTERNAL MEDICINE

## 2025-02-24 PROCEDURE — 1160F RVW MEDS BY RX/DR IN RCRD: CPT | Performed by: INTERNAL MEDICINE

## 2025-02-24 PROCEDURE — 1036F TOBACCO NON-USER: CPT | Performed by: INTERNAL MEDICINE

## 2025-02-24 PROCEDURE — 73562 X-RAY EXAM OF KNEE 3: CPT | Mod: RIGHT SIDE | Performed by: RADIOLOGY

## 2025-02-25 DIAGNOSIS — M25.561 ACUTE PAIN OF RIGHT KNEE: Primary | ICD-10-CM

## 2025-03-06 ENCOUNTER — OFFICE VISIT (OUTPATIENT)
Dept: ORTHOPEDIC SURGERY | Facility: HOSPITAL | Age: 85
End: 2025-03-06
Payer: MEDICARE

## 2025-03-06 VITALS — BODY MASS INDEX: 22.36 KG/M2 | HEIGHT: 69 IN | WEIGHT: 151 LBS

## 2025-03-06 DIAGNOSIS — M25.561 ACUTE PAIN OF RIGHT KNEE: ICD-10-CM

## 2025-03-06 DIAGNOSIS — M17.11 ARTHRITIS OF RIGHT KNEE: Primary | ICD-10-CM

## 2025-03-06 PROCEDURE — 1125F AMNT PAIN NOTED PAIN PRSNT: CPT | Performed by: ORTHOPAEDIC SURGERY

## 2025-03-06 PROCEDURE — 1159F MED LIST DOCD IN RCRD: CPT | Performed by: ORTHOPAEDIC SURGERY

## 2025-03-06 PROCEDURE — 1160F RVW MEDS BY RX/DR IN RCRD: CPT | Performed by: ORTHOPAEDIC SURGERY

## 2025-03-06 PROCEDURE — 99203 OFFICE O/P NEW LOW 30 MIN: CPT | Performed by: ORTHOPAEDIC SURGERY

## 2025-03-06 PROCEDURE — 99213 OFFICE O/P EST LOW 20 MIN: CPT | Performed by: ORTHOPAEDIC SURGERY

## 2025-03-06 PROCEDURE — 1036F TOBACCO NON-USER: CPT | Performed by: ORTHOPAEDIC SURGERY

## 2025-03-06 ASSESSMENT — PAIN DESCRIPTION - DESCRIPTORS: DESCRIPTORS: SORE

## 2025-03-06 ASSESSMENT — PAIN SCALES - GENERAL: PAINLEVEL_OUTOF10: 1

## 2025-03-06 ASSESSMENT — PAIN - FUNCTIONAL ASSESSMENT: PAIN_FUNCTIONAL_ASSESSMENT: 0-10

## 2025-03-08 NOTE — PROGRESS NOTES
84-year-old is seen with right knee pain.  He has been having persistent severe sharp shooting pain in the right knee.  Pain is worse with standing and walking and going up and down stairs and getting up and down from a chair in and out of the car.  He has taken nabumetone with some relief.  His wife has Parkinson's.    Pleasant and no acute distress. Walks with antalgic gait. Stands with varus alignment of the right knee and neutral on the left.  Right knee range of motion is 5-110°. The knee is stable to varus and valgus stress Lachman and posterior drawer. There is a mild effusion. There is generalized tenderness. Left knee range of motion is 0-120°. There is no effusion. The knee is stable to varus and valgus stress Lachman and posterior drawer. Both lower extremities are well perfused the skin is intact and muscle tone is adequate.    Multiple x-ray views of the right knee are personally reviewed and there is no acute bony abnormality.  Mild degenerative changes.    A discussion about his knee pain was done.  He would benefit from physical therapy.  With his wife having Parkinson's that is difficult for him to find time but if he can go and then start a home program this would be helpful for addressing his pain and instability.  He will continue with nabumetone.  Follow-up if not improving.  Cortisone injection could be considered.      A discussion about knee arthritis was done.  Treatment options were reviewed.

## 2025-09-08 ENCOUNTER — APPOINTMENT (OUTPATIENT)
Dept: PRIMARY CARE | Facility: CLINIC | Age: 85
End: 2025-09-08
Payer: MEDICARE